# Patient Record
(demographics unavailable — no encounter records)

---

## 2017-02-24 NOTE — EMERGENCY DEPARTMENT REPORT
HPI





- General


Chief Complaint: Abdominal Pain


Time Seen by Provider: 02/24/17 05:00





- HPI


HPI: 





20-year-old female presents today with menstrual cramping, nausea, vomiting 

since 3 AM this morning.  I asked if a history of similar symptoms.  Patient 

states that she usually has similar symptoms on the first day of her menses 

which is today.  Patient was seen at [facility] a couple months ago and was 

worked up and given medication but she did not fill the medication.  Denies any 

other medical complaints.  Denies fever, chills, chest pain, shortness of breath

, vaginal discharge, urinary symptoms.





ED Past Medical Hx





- Past Medical History


Previous Medical History?: No





- Surgical History


Past Surgical History?: No





- Social History


Smoking Status: Current Every Day Smoker


Substance Use Type: None





- Medications


Home Medications: 


 Home Medications











 Medication  Instructions  Recorded  Confirmed  Last Taken  Type


 


Ibuprofen [Motrin] 600 mg PO Q8H PRN #30 tablet 02/24/17  Unknown Rx


 


Ondansetron [Zofran Odt] 4 mg PO Q8HR #14 tab.rapdis 02/24/17  Unknown Rx














ED Review of Systems


ROS: 


Stated complaint: ABDOMINAL PAIN


Other details as noted in HPI





Constitutional: denies: chills, fever, malaise


Eyes: denies: eye pain


ENT: denies: ear pain, throat pain, congestion


Respiratory: denies: cough, shortness of breath, wheezing


Cardiovascular: denies: chest pain, palpitations


Endocrine: no symptoms reported


Gastrointestinal: abdominal pain, nausea, vomiting


Neurological: denies: headache, weakness





Physical Exam





- Physical Exam


Vital Signs: 


 Vital Signs











  02/24/17





  04:30


 


Temperature 98.0 F


 


Pulse Rate 81


 


Respiratory 26 H





Rate 


 


Blood Pressure 159/120


 


O2 Sat by Pulse 100





Oximetry 











Physical Exam: 





GENERAL: The patient is well-developed and well-nourished. Patient is in NAD. 





HEAD: Normocephalic.  Atraumatic.  





CHEST/LUNGS: Clear to auscultation throughout. 





HEART/CARDIOVASCULAR: Regular rate and rhythm.  





ABDOMEN: Abdomen is soft, nontender. Bowel sounds normoactive. No guarding or 

rebound tenderness.  Negative for CVA tenderness bilaterally.





EXTREMITIES: Peripheral pulses intact. Capillary refill less than 2 seconds. 





NEURO: Alert and oriented x 3. Normal gait. 





ED Course


 Vital Signs











  02/24/17





  04:30


 


Temperature 98.0 F


 


Pulse Rate 81


 


Respiratory 26 H





Rate 


 


Blood Pressure 159/120


 


O2 Sat by Pulse 100





Oximetry 














ED Medical Decision Making





- Lab Data





 Vital Signs











  02/24/17 02/24/17





  04:30 05:15


 


Temperature 98.0 F 97.9 F


 


Pulse Rate 81 84


 


Respiratory 26 H 20





Rate  


 


Blood Pressure 159/120 


 


Blood Pressure  145/96





[Right]  


 


O2 Sat by Pulse 100 97





Oximetry  














- Medical Decision Making





20-year-old female presents today with menstrual cramping, nausea, vomiting 2 

hours.  Positive for history of similar symptoms and states this is not any 

different.  Patient was given Toradol and Zofran today. Patient is in no acute 

distress at this time.  She will be discharged home and is encouraged to follow 

up with a primary care provider.  She will be sent home on Motrin and Zofran 

and is encouraged to return to the emergency room for any worsening symptoms. 


Critical care attestation.: 


If time is entered above; I have spent that time in minutes in the direct care 

of this critically ill patient, excluding procedure time.








ED Disposition


Clinical Impression: 


 Menstrual cramps





Nausea & vomiting


Qualifiers:


 Vomiting type: unspecified Vomiting Intractability: non-intractable Qualified 

Code(s): R11.2 - Nausea with vomiting, unspecified





Disposition: DISCHARGED TO HOME OR SELFCARE


Is pt being admited?: No


Does the pt Need Aspirin: No


Condition: Stable


Instructions:  Dysmenorrhea (ED)


Additional Instructions: 


Follow-up with primary care provider.  Return to the emergency department if 

symptoms worsen.


Prescriptions: 


Ibuprofen [Motrin] 600 mg PO Q8H PRN #30 tablet


 PRN Reason: Pain


Ondansetron [Zofran Odt] 4 mg PO Q8HR #14 tab.rapdis


Referrals: 


PRIMARY CARE,MD [Primary Care Provider] - 3-5 Days


[facility] [Outside] - 3-5 Days


Forms:  Work/School Release Form(ED)


Time of Disposition: 05:18

## 2017-05-19 NOTE — ED ELOPEMENT REVIEW
ED Pt Elopement review





- Results review


Lab results: 





 Laboratory Tests











  05/17/17 05/17/17 05/17/17





  13:32 13:32 16:15


 


WBC  15.8 H  


 


RBC  5.04 H  


 


Hgb  12.7  


 


Hct  39.7  


 


MCV  79  


 


MCH  25 L  


 


MCHC  32  


 


RDW  12.7 L  


 


Plt Count  251  


 


Lymph % (Auto)  5.9 L  


 


Mono % (Auto)  4.8  


 


Eos % (Auto)  0.1  


 


Baso % (Auto)  0.3  


 


Lymph #  0.9 L  


 


Mono #  0.8  


 


Eos #  0.0  


 


Baso #  0.1  


 


Seg Neutrophils %  88.9 H  


 


Seg Neutrophils #  14.1 H  


 


Sodium   135 L 


 


Potassium   3.8 


 


Chloride   96.7 L 


 


Carbon Dioxide   18 L 


 


Anion Gap   24 


 


BUN   10 


 


Creatinine   0.5 L 


 


Estimated GFR   > 60 


 


BUN/Creatinine Ratio   20.00 


 


Glucose   93 


 


Calcium   9.6 


 


Urine Color    Yellow


 


Urine Turbidity    Slightly-cloudy


 


Urine pH    6.0


 


Urine Protein    100 mg/dl


 


Urine Glucose (UA)    50


 


Urine Ketones    80


 


Urine Blood    Neg


 


Urine Nitrite    Neg


 


Urine Bilirubin    Neg


 


Urine Urobilinogen    < 2.0


 


Ur Leukocyte Esterase    Neg


 


Urine WBC (Auto)    2.0


 


Urine RBC (Auto)    3.0


 


U Epithel Cells (Auto)    11.0


 


Urine Mucus    3+














- Call Back decision


Pt Call Back Decision: Pt to F/U with PMD

## 2017-07-15 NOTE — EMERGENCY DEPARTMENT REPORT
HPI





- General


Chief Complaint: Vaginal Bleeding


Time Seen by Provider: 07/15/17 14:10





- HPI


HPI: 


This is a 20-year-old  female presents to the emergency department with 

complaint of vaginal spotting since yesterday while being 16 weeks pregnant.  

She is .  She does not have any OB/GYN but she has been taking prenatal 

vitamins.  She has a little bit of left-sided abdominal cramping.  She denies 

any past medical history.  No recent travel or sick contacts at home.  She is 

not taken anything for symptoms prior to presentation.








ED Past Medical Hx





- Past Medical History


Previous Medical History?: No





- Surgical History


Past Surgical History?: No





- Social History


Smoking Status: Never Smoker


Substance Use Type: None





- Medications


Home Medications: 


 Home Medications











 Medication  Instructions  Recorded  Confirmed  Last Taken  Type


 


No Known Home Medications [No  07/15/17 07/15/17 Unknown History





Reported Home Medications]     














ED Review of Systems


ROS: 


Stated complaint: SPOTTING/16 WEEKS


Other details as noted in HPI





Comment: All other systems reviewed and negative


Constitutional: denies: chills, fever


Eyes: denies: eye pain, eye discharge, vision change


ENT: denies: ear pain, throat pain


Respiratory: denies: cough, shortness of breath, wheezing


Cardiovascular: denies: chest pain, palpitations


Gastrointestinal: other (cramping).  denies: abdominal pain, nausea, diarrhea


Genitourinary: other (vaginal spotting).  denies: urgency, dysuria, discharge


Musculoskeletal: denies: back pain, joint swelling, arthralgia


Skin: denies: rash, lesions





Physical Exam





- Physical Exam


Vital Signs: 


 Vital Signs











  07/15/17 07/15/17





  11:01 14:05


 


Temperature 98.5 F 


 


Pulse Rate 102 H 88


 


Respiratory 18 18





Rate  


 


Blood Pressure 130/81 


 


Blood Pressure  134/60





[Right]  


 


O2 Sat by Pulse 99 99





Oximetry  











Physical Exam: 


GENERAL: The patient is well-developed well-nourished.


HEENT: Normocephalic.  Atraumatic.  Extraocular motions are intact.  Patient 

has moist mucous membranes.


NECK: Supple.  Trachea is midline.


CHEST/LUNGS: Clear to auscultation.  There is no respiratory distress noted.


HEART/CARDIOVASCULAR: Regular.  There is no tachycardia.  There is no gallop 

rub or murmur.


ABDOMEN: Abdomen is soft, nontender.  Patient has normal bowel sounds.  There 

is no abdominal distention.


SKIN: Skin is warm and dry.


NEURO: The patient is awake, alert, and oriented.  The patient is cooperative.  

The patient has no focal neurologic deficits.  The patient has normal speech.


MUSCULOSKELETAL: There is no tenderness or deformity.  There is no limitation 

range of motion.  There is no evidence of acute injury.








ED Course


 Vital Signs











  07/15/17 07/15/17





  11:01 14:05


 


Temperature 98.5 F 


 


Pulse Rate 102 H 88


 


Respiratory 18 18





Rate  


 


Blood Pressure 130/81 


 


Blood Pressure  134/60





[Right]  


 


O2 Sat by Pulse 99 99





Oximetry  














ED Medical Decision Making





- Lab Data


Result diagrams: 


 07/15/17 11:11








- Radiology Data


Radiology results: report reviewed


Transvaginal/fetal ultrasound shows a single live intrauterine pregnancy at 

about 15 weeks and 5 days.








- Medical Decision Making


20-year-old female presents with some light vaginal spotting and abdominal 

cramping while pregnant.  Beta hCG of 43,000.  Ultrasound shows live 

intrauterine pregnancy at about 15 weeks and 5 days.  Patient will stay on 

prenatal vitamins.  No urinary tract infection.  Given multiple OB/GYN 

referrals.  Discussed the diagnosis of threatened miscarriage.  She will return 

to the ER with any worsening of her symptoms or any acute distress.








- Differential Diagnosis


pregnant, threatened miscarriage, spontaneous miscarriage, UTI, fibroids


Critical Care Time: No


Critical care attestation.: 


If time is entered above; I have spent that time in minutes in the direct care 

of this critically ill patient, excluding procedure time.








ED Disposition


Clinical Impression: 


 Threatened miscarriage





Pregnancy


Qualifiers:


 Weeks of gestation: 15 weeks Qualified Code(s): Z3A.15 - 15 weeks gestation of 

pregnancy





Disposition: DC-01 TO HOME OR SELFCARE


Is pt being admited?: No


Condition: Stable


Instructions:  Threatened Miscarriage (ED), Pregnancy (ED)


Additional Instructions: 


Please follow-up with an OB/GYN in the next few days.  Return to the emergency 

department with any worsening of your symptoms or any acute distress.  You can 

take Tylenol every 4 hours, using weight-based dosing, as needed for 

discomfort.  Otherwise, besides the prenatal vitamins and Tylenol, do not take 

any medications that are not prescribed by physician.


Referrals: 


PRIMARY MD CHAITANYA [Primary Care Provider] - 3-5 Days


[facility] [Provider Group] - 3-5 Days


[facility] [Provider Group] - 3-5 Days


[facility] [Provider Group] - 3-5 Days


[facility] [Outside] - 3-5 Days


Time of Disposition: 16:27

## 2017-07-15 NOTE — ULTRASOUND REPORT
FINAL REPORT



PROCEDURE:  US OB \T\gt; = 14 WEEKS FETUS



TECHNIQUE:  Real-time transabdominal sonography of the uterus,

placenta, amniotic fluid, adnexa, and fetus was performed with

image documentation. Measurements were obtained to determine

fetal age/size. M-mode Doppler was used to document fetal

heartbeat. CPT 35426



HISTORY:  preg, bleeding 



COMPARISON:  No prior studies are available for comparison.



FINDINGS:  

ADDITIONAL GESTATION: None.



GENERAL:



IUP: Single living intrauterine pregnancy.  Fetal Position:

Cephalic at the time of the scan



Placental position: Anterior and grade 0, without previa. 

Amniotic fluid volume: Subjectively within normal limits



MATERNAL:



Cervical length: Transabdominal measurement is 2.4 cm. 



FETUS:



Heart rate and rhythm: 163 BPM, Regular. 



Fetal anatomic survey: Not performed



MEASUREMENTS:



BPD: 3.2 centimeters, 16 weeks 1 day



HC: 12.5 centimeters, 16 weeks 2 days



AC: 9.6 centimeters, 15 weeks 5 days



FL: 2.0 centimeters, 16 weeks 1 day



Mean Gestational Age (composite criteria): 15 weeks 5 days



Estimated Fetal Weight: 136 grams.



Estimated Due Date:01/01/2018



IMPRESSION:  

Single intrauterine gestation at 15 weeks 5 days. Estimated due

date: 01/01/2018.